# Patient Record
Sex: MALE | Race: WHITE
[De-identification: names, ages, dates, MRNs, and addresses within clinical notes are randomized per-mention and may not be internally consistent; named-entity substitution may affect disease eponyms.]

---
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
## 2024-03-23 DIAGNOSIS — R07.89 CHEST WALL PAIN: ICD-10-CM

## 2024-03-23 RX ORDER — TRAMADOL HYDROCHLORIDE 50 MG/1
50 TABLET ORAL EVERY 4 HOURS PRN
Qty: 18 TABLET | Refills: 0 | OUTPATIENT
Start: 2024-03-23 | End: 2024-03-24 | Stop reason: SDUPTHER
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
## 2024-03-24 DIAGNOSIS — R07.89 CHEST WALL PAIN: ICD-10-CM

## 2024-03-24 RX ORDER — TRAMADOL HYDROCHLORIDE 50 MG/1
50 TABLET ORAL EVERY 4 HOURS PRN
Qty: 18 TABLET | Refills: 0 | Status: SHIPPED | OUTPATIENT
Start: 2024-03-24 | End: 2024-03-27

## 2024-07-20 ENCOUNTER — APPOINTMENT (OUTPATIENT)
Facility: HOSPITAL | Age: 64
End: 2024-07-20

## 2024-07-20 ENCOUNTER — HOSPITAL ENCOUNTER (EMERGENCY)
Facility: HOSPITAL | Age: 64
Discharge: HOME | End: 2024-07-20
Attending: STUDENT IN AN ORGANIZED HEALTH CARE EDUCATION/TRAINING PROGRAM

## 2024-07-20 VITALS
DIASTOLIC BLOOD PRESSURE: 103 MMHG | SYSTOLIC BLOOD PRESSURE: 173 MMHG | RESPIRATION RATE: 20 BRPM | HEIGHT: 72 IN | OXYGEN SATURATION: 98 % | TEMPERATURE: 98.1 F | HEART RATE: 98 BPM

## 2024-07-20 DIAGNOSIS — E87.6 HYPOKALEMIA: ICD-10-CM

## 2024-07-20 DIAGNOSIS — R41.82 ALTERED MENTAL STATUS, UNSPECIFIED ALTERED MENTAL STATUS TYPE: Primary | ICD-10-CM

## 2024-07-20 LAB
ALBUMIN SERPL-MCNC: 4.1 G/DL (ref 3.5–5)
ALBUMIN/GLOB SERPL: 1.2 (ref 1.1–2.2)
ALP SERPL-CCNC: 101 U/L (ref 45–117)
ALT SERPL-CCNC: 36 U/L (ref 12–78)
ANION GAP SERPL CALC-SCNC: 4 MMOL/L (ref 5–15)
AST SERPL-CCNC: 33 U/L (ref 15–37)
BASOPHILS # BLD: 0.1 K/UL (ref 0–0.1)
BASOPHILS NFR BLD: 1 % (ref 0–1)
BILIRUB SERPL-MCNC: 0.4 MG/DL (ref 0.2–1)
BUN SERPL-MCNC: 18 MG/DL (ref 6–20)
BUN/CREAT SERPL: 16 (ref 12–20)
CALCIUM SERPL-MCNC: 9.6 MG/DL (ref 8.5–10.1)
CHLORIDE SERPL-SCNC: 105 MMOL/L (ref 97–108)
CO2 SERPL-SCNC: 31 MMOL/L (ref 21–32)
CREAT SERPL-MCNC: 1.14 MG/DL (ref 0.7–1.3)
DIFFERENTIAL METHOD BLD: NORMAL
EOSINOPHIL # BLD: 0.2 K/UL (ref 0–0.4)
EOSINOPHIL NFR BLD: 3 % (ref 0–7)
ERYTHROCYTE [DISTWIDTH] IN BLOOD BY AUTOMATED COUNT: 12.2 % (ref 11.5–14.5)
ETHANOL SERPL-MCNC: <10 MG/DL (ref 0–0.08)
GLOBULIN SER CALC-MCNC: 3.4 G/DL (ref 2–4)
GLUCOSE SERPL-MCNC: 100 MG/DL (ref 65–100)
HCT VFR BLD AUTO: 42.2 % (ref 36.6–50.3)
HGB BLD-MCNC: 14.4 G/DL (ref 12.1–17)
IMM GRANULOCYTES # BLD AUTO: 0 K/UL (ref 0–0.04)
IMM GRANULOCYTES NFR BLD AUTO: 0 % (ref 0–0.5)
LYMPHOCYTES # BLD: 1.3 K/UL (ref 0.8–3.5)
LYMPHOCYTES NFR BLD: 21 % (ref 12–49)
MAGNESIUM SERPL-MCNC: 1.9 MG/DL (ref 1.6–2.4)
MCH RBC QN AUTO: 33.1 PG (ref 26–34)
MCHC RBC AUTO-ENTMCNC: 34.1 G/DL (ref 30–36.5)
MCV RBC AUTO: 97 FL (ref 80–99)
MONOCYTES # BLD: 0.5 K/UL (ref 0–1)
MONOCYTES NFR BLD: 8 % (ref 5–13)
NEUTS SEG # BLD: 4.1 K/UL (ref 1.8–8)
NEUTS SEG NFR BLD: 67 % (ref 32–75)
NRBC # BLD: 0 K/UL (ref 0–0.01)
NRBC BLD-RTO: 0 PER 100 WBC
PLATELET # BLD AUTO: 219 K/UL (ref 150–400)
PMV BLD AUTO: 9.4 FL (ref 8.9–12.9)
POTASSIUM SERPL-SCNC: 3.2 MMOL/L (ref 3.5–5.1)
PROT SERPL-MCNC: 7.5 G/DL (ref 6.4–8.2)
RBC # BLD AUTO: 4.35 M/UL (ref 4.1–5.7)
SODIUM SERPL-SCNC: 140 MMOL/L (ref 136–145)
WBC # BLD AUTO: 6.1 K/UL (ref 4.1–11.1)

## 2024-07-20 PROCEDURE — 6370000000 HC RX 637 (ALT 250 FOR IP): Performed by: STUDENT IN AN ORGANIZED HEALTH CARE EDUCATION/TRAINING PROGRAM

## 2024-07-20 PROCEDURE — 70450 CT HEAD/BRAIN W/O DYE: CPT

## 2024-07-20 PROCEDURE — 80053 COMPREHEN METABOLIC PANEL: CPT

## 2024-07-20 PROCEDURE — 36415 COLL VENOUS BLD VENIPUNCTURE: CPT

## 2024-07-20 PROCEDURE — 99284 EMERGENCY DEPT VISIT MOD MDM: CPT

## 2024-07-20 PROCEDURE — 85025 COMPLETE CBC W/AUTO DIFF WBC: CPT

## 2024-07-20 PROCEDURE — 93005 ELECTROCARDIOGRAM TRACING: CPT | Performed by: STUDENT IN AN ORGANIZED HEALTH CARE EDUCATION/TRAINING PROGRAM

## 2024-07-20 PROCEDURE — 82077 ASSAY SPEC XCP UR&BREATH IA: CPT

## 2024-07-20 PROCEDURE — 83735 ASSAY OF MAGNESIUM: CPT

## 2024-07-20 RX ORDER — ONDANSETRON 2 MG/ML
4 INJECTION INTRAMUSCULAR; INTRAVENOUS ONCE
Status: DISCONTINUED | OUTPATIENT
Start: 2024-07-20 | End: 2024-07-20 | Stop reason: HOSPADM

## 2024-07-20 RX ADMIN — POTASSIUM BICARBONATE 40 MEQ: 782 TABLET, EFFERVESCENT ORAL at 20:33

## 2024-07-20 ASSESSMENT — PAIN - FUNCTIONAL ASSESSMENT: PAIN_FUNCTIONAL_ASSESSMENT: 0-10

## 2024-07-20 ASSESSMENT — PAIN SCALES - GENERAL: PAINLEVEL_OUTOF10: 0

## 2024-07-20 ASSESSMENT — LIFESTYLE VARIABLES
HOW MANY STANDARD DRINKS CONTAINING ALCOHOL DO YOU HAVE ON A TYPICAL DAY: PATIENT DOES NOT DRINK
HOW OFTEN DO YOU HAVE A DRINK CONTAINING ALCOHOL: NEVER

## 2024-07-20 NOTE — ED NOTES
Patient back in room; apparently he walked to the cafeteria and ate dinner. Patient refusing any labs/EKG done.Charge nurse aware and is bedside now to talk with patient.

## 2024-07-20 NOTE — ED NOTES
This nurse entered patient room to find that he eloped; charge nurse aware; spouse also made aware. Patient did not have a PIV placed prior to elopement.

## 2024-07-20 NOTE — ED PROVIDER NOTES
friend came to pick him up and expressed no concerns for dc home. He has tolerated PO and is alert and oriented.  Understood PCP follow up.      I completed a structured, evidence-based clinical evaluation to screen for cardiac and other potentially dangerous causes of syncope  in this patient. The evidence indicates that the patient is very low risk for a cardiac or other dangerous cause of syncope and this is  consistent with my clinical intuition.  The risk of further workup or hospitalization for cardiac or other dangerous cause is likely higher than the risk of the patient having a  cardiac or other dangerous cause of syncope. It is, therefore, in the patient’s best interest not to do additional emergent testing or  hospitalize the patient for syncope at this time.       Patient understands that this still may have an early presentation of an emergent medical condition that will require a recheck. I specifically discussed return precautions with the patient and they agree to follow up as an outpatient or return to the ER if symptoms do not improve, change or worsen.          Disposition Considerations (Tests not done, Shared Decision Making, Pt Expectation of Test or Tx.):      FINAL IMPRESSION     1. Altered mental status, unspecified altered mental status type    2. Hypokalemia          DISPOSITION/PLAN   DISPOSITION Decision To Discharge 07/20/2024 08:20:36 PM      Discharge Note: The patient is stable for discharge home. The signs, symptoms, diagnosis, and discharge instructions have been discussed, understanding conveyed, and agreed upon. The patient is to follow up as recommended or return to ER should their symptoms worsen.      PATIENT REFERRED TO:  [facility] EMERGENCY DEPT  [address] [address] [phone]  Go to   If symptoms worsen    Dylan Cox DO  [address]  [address]  [facility] [address]  [facility] [address] [phone]    Schedule an appointment as soon as possible for a  visit   for old stroke         DISCHARGE MEDICATIONS:     Medication List      You have not been prescribed any medications.           DISCONTINUED MEDICATIONS:  There are no discharge medications for this patient.      I am the Primary Clinician of Record.   Parminder Rausch DO (electronically signed)      (Please note that parts of this dictation were completed with voice recognition software. Quite often unanticipated grammatical, syntax, homophones, and other interpretive errors are inadvertently transcribed by the computer software. Please disregards these errors. Please excuse any errors that have escaped final proofreading.)         Parminder Rausch DO  07/21/24 0742

## 2024-07-20 NOTE — ED NOTES
PT REFUSED TO ALLOW ME TO RETRIEVE ANY LABS FROM HIM HE SAID \"I'M GETTING READY TO CHECK UP OUT OF HERE\"   INFORMED LO PARMAR AT THIS TIME

## 2024-07-21 LAB
EKG ATRIAL RATE: 96 BPM
EKG DIAGNOSIS: NORMAL
EKG P AXIS: 90 DEGREES
EKG P-R INTERVAL: 130 MS
EKG Q-T INTERVAL: 352 MS
EKG QRS DURATION: 98 MS
EKG QTC CALCULATION (BAZETT): 444 MS
EKG R AXIS: 92 DEGREES
EKG T AXIS: 9 DEGREES
EKG VENTRICULAR RATE: 96 BPM

## 2024-07-21 NOTE — DISCHARGE INSTRUCTIONS
CT Head W/O Contrast    Result Date: 7/20/2024  EXAM: CT HEAD WO CONTRAST INDICATION: Acute mental status change, patient found unresponsive COMPARISON: 5/17/2014. CONTRAST: None. TECHNIQUE: Unenhanced CT of the head was performed using 5 mm images. Brain and bone windows were generated. Coronal and sagittal reformats. CT dose reduction was achieved through use of a standardized protocol tailored for this examination and automatic exposure control for dose modulation.  FINDINGS: The ventricles and sulci are fairly symmetrical in size, shape and configuration. There is a well-defined infarct in the right basal ganglia. There is no intracranial hemorrhage, extra-axial collection, or mass effect. The basilar cisterns are open. No CT evidence of acute infarct. Empty sella. The bone windows demonstrate no abnormalities. The visualized portions of the paranasal sinuses and mastoid air cells are clear.     Interval development of old right basal ganglia infarct. No acute process identified on noncontrast CT. Electronically signed by Maryjane Moses     Recent Results (from the past 24 hour(s))   EKG 12 Lead (Abn HR)    Collection Time: 07/20/24  6:05 PM   Result Value Ref Range    Ventricular Rate 96 BPM    Atrial Rate 96 BPM    P-R Interval 130 ms    QRS Duration 98 ms    Q-T Interval 352 ms    QTc Calculation (Bazett) 444 ms    P Axis 90 degrees    R Axis 92 degrees    T Axis 9 degrees    Diagnosis       ** Suspect arm lead reversal, interpretation assumes no reversal  Normal sinus rhythm  Right atrial enlargement  Rightward axis  Pulmonary disease pattern  When compared with ECG of 17-MAY-2014 11:40,  Non-specific change in ST segment in Inferior leads  T wave inversion now evident in Inferior leads  T wave amplitude has decreased in Lateral leads     CBC with Auto Differential    Collection Time: 07/20/24  6:06 PM   Result Value Ref Range    WBC 6.1 4.1 - 11.1 K/uL    RBC 4.35 4.10 - 5.70 M/uL    Hemoglobin 14.4 12.1 -

## 2025-01-15 ENCOUNTER — OFFICE VISIT (OUTPATIENT)
Age: 65
End: 2025-01-15

## 2025-01-15 VITALS
SYSTOLIC BLOOD PRESSURE: 142 MMHG | DIASTOLIC BLOOD PRESSURE: 92 MMHG | BODY MASS INDEX: 17.63 KG/M2 | HEART RATE: 85 BPM | OXYGEN SATURATION: 99 % | WEIGHT: 130 LBS

## 2025-01-15 DIAGNOSIS — F32.1 CURRENT MODERATE EPISODE OF MAJOR DEPRESSIVE DISORDER, UNSPECIFIED WHETHER RECURRENT (HCC): ICD-10-CM

## 2025-01-15 DIAGNOSIS — Z87.820 HISTORY OF CLOSED HEAD INJURY: ICD-10-CM

## 2025-01-15 DIAGNOSIS — Z86.73 HISTORY OF STROKE: ICD-10-CM

## 2025-01-15 DIAGNOSIS — R41.3 MEMORY LOSS: ICD-10-CM

## 2025-01-15 DIAGNOSIS — G31.84 MILD COGNITIVE IMPAIRMENT: Primary | ICD-10-CM

## 2025-01-15 PROCEDURE — 99204 OFFICE O/P NEW MOD 45 MIN: CPT | Performed by: PSYCHIATRY & NEUROLOGY

## 2025-01-15 ASSESSMENT — PATIENT HEALTH QUESTIONNAIRE - PHQ9
9. THOUGHTS THAT YOU WOULD BE BETTER OFF DEAD, OR OF HURTING YOURSELF: SEVERAL DAYS
7. TROUBLE CONCENTRATING ON THINGS, SUCH AS READING THE NEWSPAPER OR WATCHING TELEVISION: MORE THAN HALF THE DAYS
SUM OF ALL RESPONSES TO PHQ9 QUESTIONS 1 & 2: 6
5. POOR APPETITE OR OVEREATING: MORE THAN HALF THE DAYS
6. FEELING BAD ABOUT YOURSELF - OR THAT YOU ARE A FAILURE OR HAVE LET YOURSELF OR YOUR FAMILY DOWN: NOT AT ALL
1. LITTLE INTEREST OR PLEASURE IN DOING THINGS: NEARLY EVERY DAY
SUM OF ALL RESPONSES TO PHQ QUESTIONS 1-9: 15
SUM OF ALL RESPONSES TO PHQ QUESTIONS 1-9: 14
2. FEELING DOWN, DEPRESSED OR HOPELESS: NEARLY EVERY DAY
8. MOVING OR SPEAKING SO SLOWLY THAT OTHER PEOPLE COULD HAVE NOTICED. OR THE OPPOSITE, BEING SO FIGETY OR RESTLESS THAT YOU HAVE BEEN MOVING AROUND A LOT MORE THAN USUAL: MORE THAN HALF THE DAYS
4. FEELING TIRED OR HAVING LITTLE ENERGY: MORE THAN HALF THE DAYS
SUM OF ALL RESPONSES TO PHQ QUESTIONS 1-9: 15
3. TROUBLE FALLING OR STAYING ASLEEP: NOT AT ALL
SUM OF ALL RESPONSES TO PHQ QUESTIONS 1-9: 15

## 2025-01-15 ASSESSMENT — MINI MENTAL STATE EXAM
WHAT FLOOR ARE WE ON [IN FACILITY]?/ WHAT ROOM ARE WE IN [IN HOME]?: 0
WHAT DAY OF THE WEEK IS THIS?: 0
ASK THE PERSON IF HE IS RIGHT OR LEFT-HANDED. TAKE A PIECE OF PAPER AND HOLD IT UP IN
FRONT OF THE PERSON. SAY: TAKE THIS PAPER IN YOUR RIGHT/LEFT HAND (WHICHEVER IS NON-
DOMINANT), SCORE IF PAPER IS PICKED UP IN CORRECT HAND.: 1
WHAT STATE [OR PROVINCE] ARE WE IN?: 1
NOW WHAT WERE THE THREE OBJECTS I ASKED YOU TO REMEMBER?: 1
SHOW: WRISTWATCH [OBJECT] ASK: WHAT IS THIS CALLED?: 1
WHAT YEAR IS THIS?: 1
SAY: I AM GOING TO NAME THREE OBJECTS. WHEN I AM FINISHED, I WANT YOU TO REPEAT
THEM. REMEMBER WHAT THEY ARE BECAUSE I AM GOING TO ASK YOU TO NAME THEM AGAIN IN
A FEW MINUTES.  SAY THE FOLLOWING WORDS SLOWLY AT 1-SECOND INTERVALS - BALL/ CAR/ MAN [ITERATIONS FOR REPEAT ADMINISTRATION]: 3
WHAT CITY/TOWN ARE WE IN?: 0
SUM ALL MMSE QUESTIONS FOR TOTAL SCORE [OUT OF 30].: 23
SHOW: PENCIL [OBJECT] ASK: WHAT IS THIS CALLED?: 1
WHICH SEASON IS THIS?: 1
SAY: PUT THE PAPER DOWN ON THE FLOOR, SCORE IF PAPER IS PLACED BACK ON FLOOR: 1
WHAT MONTH IS THIS?: 1
PLACE DESIGN, ERASER AND PENCIL IN FRONT OF THE PERSON.  SAY:  COPY THIS DESIGN PLEASE.  SHOW: DESIGN. ALLOW: MULTIPLE TRIES. WAIT UNTIL PERSON IS FINISHED AND HANDS IT BACK. SCORE: ONLY FOR DIAGRAM WITH 4-SIDED FIGURE BETWEEN TWO 5-SIDED FIGURES: 1
SAY: I WOULD LIKE YOU TO COUNT BACKWARD FROM 100 BY SEVENS: 3
SAY: READ THE WORDS ON THE PAGE AND THEN DO WHAT IT SAYS. THEN HAND THE PERSON
THE SHEET WITH CLOSE YOUR EYES ON IT. IF THE SUBJECT READS AND DOES NOT CLOSE THEIR EYES, REPEAT UP TO THREE TIMES. SCORE ONLY IF SUBJECT CLOSES EYES.: 1
SAY: I WOULD LIKE YOU TO REPEAT THIS PHRASE AFTER ME: NO IFS, ANDS, OR BUTS.: 1
WHAT IS TODAY'S DATE?: 1
HAND THE PERSON A PENCIL AND PAPER. SAY: WRITE ANY COMPLETE SENTENCE ON THAT
PIECE OF PAPER. (NOTE: THE SENTENCE MUST MAKE SENSE. IGNORE SPELLING ERRORS): 1
SAY: FOLD THE PAPER IN HALF ONCE WITH BOTH HANDS, SCORE IF PAPER IS CORRECTLY FOLDED IN HALF.: 1
WHAT COUNTRY ARE WE IN?: 1
WHAT IS THE NAME OF THIS BUILDING [IN FACILITY]?/WHAT IS THE STREET ADDRESS OF THIS HOUSE [IN HOME]?: 1

## 2025-01-15 ASSESSMENT — ANXIETY QUESTIONNAIRES
4. TROUBLE RELAXING: MORE THAN HALF THE DAYS
3. WORRYING TOO MUCH ABOUT DIFFERENT THINGS: MORE THAN HALF THE DAYS
5. BEING SO RESTLESS THAT IT IS HARD TO SIT STILL: MORE THAN HALF THE DAYS
2. NOT BEING ABLE TO STOP OR CONTROL WORRYING: NEARLY EVERY DAY
GAD7 TOTAL SCORE: 18
1. FEELING NERVOUS, ANXIOUS, OR ON EDGE: NEARLY EVERY DAY
7. FEELING AFRAID AS IF SOMETHING AWFUL MIGHT HAPPEN: NEARLY EVERY DAY
6. BECOMING EASILY ANNOYED OR IRRITABLE: NEARLY EVERY DAY
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
## 2025-01-15 NOTE — PROGRESS NOTES
[facility]  [facility]  [address]  [place] [address]  Phone: [phone] fax: [phone]          Jamshid Acosta is a 64 y.o. male who presents today for the following:  Chief Complaint   Patient presents with    New Patient     Memory concerns and encephalomalacia     Assessment & Plan  Mild cognitive impairment  Probably multifactorial in nature.  History of closed head injury history of stroke history of substance abuse strong family history of dementia.  MMSE 23/30 PHQ 9: 15    Patient needs MRI of the brain PET amyloid scan as well as neuropsych testing and blood work all of which have been ordered.     He will also be given a list of mental health providers in the area to get established with to help treat the depression.      Orders:    PET BRAIN AMYLOID IMAGING; Future    [facility] - Jake Olivares PsyD, Neuropsychology, [place]    MRI BRAIN W WO CONTRAST; Future    Comprehensive Metabolic Panel; Future    CBC with Auto Differential; Future    TSH; Future    Vitamin B6; Future    Vitamin B1, Whole Blood; Future    Vitamin B12; Future    Current moderate episode of major depressive disorder, unspecified whether recurrent (HCC)  PHQ score 15.  Patient states he occasionally thinks about suicide but does not have a plan to act on it.  He is interested in mental health intervention.  He has been given a list of healthcare providers in the region to reach out to you to get established.           Memory loss  Patient reports memory loss especially short-term memory.  MMSE is 23 out of 30 PHQ is 15 unclear etiology needs further assessment to include MRI of the brain, amyloid PET scan, neuropsych testing and blood work all of which has been ordered     Orders:    PET BRAIN AMYLOID IMAGING; Future    [facility] - Jake Olivares PsyD, Neuropsychology, [place]    MRI BRAIN W WO CONTRAST; Future    Comprehensive Metabolic Panel; Future    CBC

## 2025-01-15 NOTE — ASSESSMENT & PLAN NOTE
Patient reports memory loss especially short-term memory.  MMSE is 23 out of 30 PHQ is 15 unclear etiology needs further assessment to include MRI of the brain, amyloid PET scan, neuropsych testing and blood work all of which has been ordered     Orders:    PET BRAIN AMYLOID IMAGING; Future    [facility] - Jake Olivares PsyD, Neuropsychology, [place]    MRI BRAIN W WO CONTRAST; Future    Comprehensive Metabolic Panel; Future    CBC with Auto Differential; Future    TSH; Future    Vitamin B6; Future    Vitamin B1, Whole Blood; Future    Vitamin B12; Future
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
## 2025-01-15 NOTE — ASSESSMENT & PLAN NOTE
Probably multifactorial in nature.  History of closed head injury history of stroke history of substance abuse strong family history of dementia.  MMSE 23/30 PHQ 9: 15    Patient needs MRI of the brain PET amyloid scan as well as neuropsych testing and blood work all of which have been ordered.     He will also be given a list of mental health providers in the area to get established with to help treat the depression.      Orders:    PET BRAIN AMYLOID IMAGING; Future    [facility] - Jake Olivares PsyD, Neuropsychology, [place]    MRI BRAIN W WO CONTRAST; Future    Comprehensive Metabolic Panel; Future    CBC with Auto Differential; Future    TSH; Future    Vitamin B6; Future    Vitamin B1, Whole Blood; Future    Vitamin B12; Future

## 2025-01-15 NOTE — ASSESSMENT & PLAN NOTE
Noted on CT scan we will check MRI scan for additional pathology regarding cerebrovascular issues or other that may be contributory to patient's cognitive status

## 2025-01-15 NOTE — ASSESSMENT & PLAN NOTE
PHQ score 15.  Patient states he occasionally thinks about suicide but does not have a plan to act on it.  He is interested in mental health intervention.  He has been given a list of healthcare providers in the region to reach out to you to get established.

## 2025-01-15 NOTE — ASSESSMENT & PLAN NOTE
Possibly contributory to patient's cognitive status MRI scan of the brain has been ordered along with neuropsych testing     Orders:    PET BRAIN AMYLOID IMAGING; Future    [facility] - Jake Olivares PsyD, Neuropsychology, [place]    MRI BRAIN W WO CONTRAST; Future

## 2025-02-10 ENCOUNTER — TELEPHONE (OUTPATIENT)
Dept: ADMINISTRATIVE | Facility: CLINIC | Age: 65
End: 2025-02-10

## 2025-02-10 NOTE — TELEPHONE ENCOUNTER
Received a call from patient who called to verify his appt with Dr Olivares on 5/30/25. Patient mentioned that he had disability papers that need filled out. Our protocol shows that Dr Olivares does not see patients for disability claims. Please reach out to the patient if his appt needs to be cancelled.

## 2025-02-11 NOTE — TELEPHONE ENCOUNTER
Called and spoke to patient. Explained neuropsych testing is a tool similar to an xray or MRI that assist the referring provider with treatment and care. Informed since Dr Olivares is not a treating provider, he is unable to fill out disability paperwork. His report will assist the referring provider with their decision about his disability. Patient expressed understanding and thanked me for calling.

## 2025-02-14 ENCOUNTER — TELEMEDICINE (OUTPATIENT)
Age: 65
End: 2025-02-14

## 2025-02-14 DIAGNOSIS — G31.84 MILD COGNITIVE IMPAIRMENT: Primary | ICD-10-CM

## 2025-02-14 DIAGNOSIS — F32.1 CURRENT MODERATE EPISODE OF MAJOR DEPRESSIVE DISORDER, UNSPECIFIED WHETHER RECURRENT (HCC): ICD-10-CM

## 2025-02-14 DIAGNOSIS — F19.91 HISTORY OF DRUG USE: ICD-10-CM

## 2025-02-14 DIAGNOSIS — Z86.73 HISTORY OF STROKE: ICD-10-CM

## 2025-02-14 NOTE — PROGRESS NOTES
Intake Note      Patient Name: Jamshid Acosta  YOB: 1960    Age: 64 y.o.  Date of Intake: 2/14/2025   Education: 11 Ethnicity White   Gender: Male Referring Provider: DEVEN Griffin     REASON FOR REFERRAL AND EVALUATION PROCEDURES:  Jamshid Acosta  was referred for evaluation by his Neurology Provider to assist in differential diagnosis and individualized treatment planning. he understood the rationale and procedures for evaluation, as well as the limits to confidentiality, and agreed to participate. he consented to have this report made available to his  treating providers through his  electronic medical records.   History Sources: Patient and Medical Record    HISTORY OF PRESENT ILLNESS:  The patient is a 64-year-old male with pertinent medical history noted for syncope and collapse, myoclonic jerking, memory loss, mild cognitive impairment, closed head injury, stroke, and moderate episode of major depressive disorder.  He presented independently for clinical interview and appeared to be an adequate historian.  Per the patient's report, he noticed the relatively sudden emergence of changes in his cognitive functioning approximately 1 year ago.  He described the changes to include forgetting names, forgetting dates, having difficulty recalling events from years ago, attention deficits (e.g., losing his train of thought), and forgetting upcoming appointments.  The patient reported he believes these things are worsening gradually over time and he is concerned because of a family history of dementia.  The patient reported his father started showing signs of memory loss around the age of eighty[age]three and he passed away around the age of eighty[age]eight.  His paternal grandfather and paternal great grandfather also had history of dementia but there is no history of early onset Alzheimer's disease in the family.    With regard to his daily functioning, the patient stated he has a license but he has not

## 2025-02-25 ENCOUNTER — PROCEDURE VISIT (OUTPATIENT)
Age: 65
End: 2025-02-25
Payer: MEDICAID

## 2025-02-25 DIAGNOSIS — Z76.5 MALINGERING: ICD-10-CM

## 2025-02-25 DIAGNOSIS — G31.84 MILD COGNITIVE IMPAIRMENT: Primary | ICD-10-CM

## 2025-02-25 PROCEDURE — 96132 NRPSYC TST EVAL PHYS/QHP 1ST: CPT | Performed by: CLINICAL NEUROPSYCHOLOGIST

## 2025-02-25 PROCEDURE — 96138 PSYCL/NRPSYC TECH 1ST: CPT | Performed by: CLINICAL NEUROPSYCHOLOGIST

## 2025-02-25 PROCEDURE — 96139 PSYCL/NRPSYC TST TECH EA: CPT | Performed by: CLINICAL NEUROPSYCHOLOGIST

## 2025-02-25 PROCEDURE — 96133 NRPSYC TST EVAL PHYS/QHP EA: CPT | Performed by: CLINICAL NEUROPSYCHOLOGIST

## 2025-03-19 NOTE — PROGRESS NOTES
Revised (BVMT-R); [place] Sleepiness Scale (ESS); and [place] Sleep Quality Inventory (PSQI).    FINDINGS AND IMPRESSION:  The patient's evaluation was discontinued early.  While he was participating in testing, multiple law enforcement officers from several counties arrived in the clinic with a warrant for the patient's arrest.  He was arrested, taken into custody, and the evaluation was terminated.    Performance and symptom validity are analyzed in a number of ways, including administration of neuropsychological measures that have been empirically shown to identify suboptimal performance or purposeful exaggeration. These tests and their scores have been redacted from this report in order to ensure test security, but are available to formally trained neuropsychologists upon request. In addition, when possible, the overall pattern of performance is analyzed for consistency between measures, consistency with the expected severity of impairment, and the presenting symptoms are compared against base rates of symptoms in other patients with similar problems.     Results of the patient's neuropsychological evaluation are unreliable and they are thus invalid measures of his optimal cognitive functioning.  On a robust performance validity test, the patient performed in the chance range.  This level of performance is below what would be expected even for individuals with profound memory impairment due to medial temporal lobe injury.  This performance stands in pena contrast to other learning performances where his rate of learning was in the average range.  Is also inconsistent with his presentation during the clinical interview, during which he recalled specific details about recent and upcoming events.  While the patient's medical record is noted for conditions that can affect cognitive functioning (i.e., stroke and possible head injury), those conditions would not cause the performances revealed by testing.    With